# Patient Record
Sex: MALE | NOT HISPANIC OR LATINO | Employment: FULL TIME | URBAN - METROPOLITAN AREA
[De-identification: names, ages, dates, MRNs, and addresses within clinical notes are randomized per-mention and may not be internally consistent; named-entity substitution may affect disease eponyms.]

---

## 2021-05-24 ENCOUNTER — HOSPITAL ENCOUNTER (EMERGENCY)
Facility: HOSPITAL | Age: 26
Discharge: HOME | End: 2021-05-25
Admitting: EMERGENCY MEDICINE

## 2021-05-24 DIAGNOSIS — S61.512A WRIST LACERATION, LEFT, INITIAL ENCOUNTER: Primary | ICD-10-CM

## 2021-05-24 PROCEDURE — 99282 EMERGENCY DEPT VISIT SF MDM: CPT

## 2021-05-25 VITALS
WEIGHT: 185.19 LBS | HEIGHT: 67 IN | TEMPERATURE: 97.8 F | SYSTOLIC BLOOD PRESSURE: 118 MMHG | RESPIRATION RATE: 16 BRPM | BODY MASS INDEX: 29.07 KG/M2 | DIASTOLIC BLOOD PRESSURE: 75 MMHG | HEART RATE: 85 BPM | OXYGEN SATURATION: 99 %

## 2021-05-25 PROCEDURE — 90471 IMMUNIZATION ADMIN: CPT | Performed by: NURSE PRACTITIONER

## 2021-05-25 PROCEDURE — 25010000002 TDAP 5-2.5-18.5 LF-MCG/0.5 SUSPENSION: Performed by: NURSE PRACTITIONER

## 2021-05-25 PROCEDURE — 90715 TDAP VACCINE 7 YRS/> IM: CPT | Performed by: NURSE PRACTITIONER

## 2021-05-25 RX ORDER — CEPHALEXIN 500 MG/1
500 CAPSULE ORAL 4 TIMES DAILY
Qty: 28 CAPSULE | Refills: 0 | Status: SHIPPED | OUTPATIENT
Start: 2021-05-25

## 2021-05-25 RX ADMIN — TETANUS TOXOID, REDUCED DIPHTHERIA TOXOID AND ACELLULAR PERTUSSIS VACCINE, ADSORBED 0.5 ML: 5; 2.5; 8; 8; 2.5 SUSPENSION INTRAMUSCULAR at 00:40

## 2021-05-25 NOTE — ED PROVIDER NOTES
Subjective   Patient presents with:  Laceration    Provider, No Known    Patient is a 26-year-old male, no significant medical history, no medications, comes in the emergency department for a laceration to his left wrist, this was an accidental injury that happened at work with a .  The occurred just prior to arrival.  Patient denies any numbness or tingling.  No weakness.  He is unsure of his last tetanus immunization.          Review of Systems   Constitutional: Negative for chills and fever.   Skin: Positive for wound.       No past medical history on file.    No Known Allergies    No past surgical history on file.    No family history on file.    Social History     Socioeconomic History   • Marital status: Single     Spouse name: Not on file   • Number of children: Not on file   • Years of education: Not on file   • Highest education level: Not on file           Objective   Physical Exam  Vitals and nursing note reviewed.   Constitutional:       General: He is not in acute distress.     Appearance: Normal appearance. He is not ill-appearing, toxic-appearing or diaphoretic.      Comments: Resting comfortably in bed without acute distress   HENT:      Head: Normocephalic and atraumatic.      Nose: Nose normal.   Eyes:      Extraocular Movements: Extraocular movements intact.      Conjunctiva/sclera: Conjunctivae normal.      Pupils: Pupils are equal, round, and reactive to light.   Cardiovascular:      Rate and Rhythm: Normal rate and regular rhythm.      Heart sounds: Normal heart sounds. No murmur heard.   No friction rub. No gallop.    Pulmonary:      Effort: Pulmonary effort is normal.      Breath sounds: Normal breath sounds.   Musculoskeletal:      Left wrist: Laceration present. No swelling, deformity, effusion, tenderness, bony tenderness, snuff box tenderness or crepitus. Normal range of motion. Normal pulse.      Comments: Superficial laceration over the volar aspect of the wrist.  1 cm.   "Bleeding controlled.  No evidence for tendon injury.  No weakness noted.  Moves all digits through range of motion.  Radial pulse 2+.  Cap refill brisk.   Skin:     General: Skin is warm and dry.      Capillary Refill: Capillary refill takes less than 2 seconds.   Neurological:      General: No focal deficit present.      Mental Status: He is alert and oriented to person, place, and time.         Laceration Repair    Date/Time: 5/25/2021 12:28 AM  Performed by: Radha Angulo APRN  Authorized by: Radha Angulo APRN     Consent:     Consent obtained:  Verbal    Consent given by:  Patient    Risks discussed:  Infection, pain, poor cosmetic result, poor wound healing, vascular damage, nerve damage and need for additional repair    Alternatives discussed:  No treatment and observation  Anesthesia (see MAR for exact dosages):     Anesthesia method:  Local infiltration    Local anesthetic:  Lidocaine 2% w/o epi  Laceration details:     Location:  Hand    Hand location:  L wrist    Length (cm):  1  Pre-procedure details:     Preparation:  Patient was prepped and draped in usual sterile fashion  Exploration:     Hemostasis achieved with:  Direct pressure    Wound exploration: wound explored through full range of motion      Contaminated: no    Treatment:     Area cleansed with:  Saline and Betadine    Amount of cleaning:  Standard    Irrigation solution:  Sterile saline    Irrigation method:  Syringe  Skin repair:     Repair method:  Sutures    Suture size:  5-0    Suture material:  Nylon    Suture technique:  Simple interrupted    Number of sutures:  3  Approximation:     Approximation:  Close  Post-procedure details:     Dressing:  Open (no dressing)    Patient tolerance of procedure:  Tolerated well, no immediate complications               ED Course      /75 (BP Location: Right arm, Patient Position: Sitting)   Pulse 85   Temp 97.8 °F (36.6 °C) (Oral)   Resp 16   Ht 170.2 cm (67\")   Wt 84 kg (185 lb 3 " oz)   SpO2 99%   BMI 29.00 kg/m²   Labs Reviewed - No data to display  Medications   Tdap (BOOSTRIX) injection 0.5 mL (0.5 mL Intramuscular Given 5/25/21 0040)     No radiology results for the last day       Appropriate PPE was worn during the duration of the care for this patient while in the emergency department per [facility] guidelines                                MDM  Number of Diagnoses or Management Options  Wrist laceration, left, initial encounter  Diagnosis management comments: Patient is a 26-year-old male present emergency department for laceration to his left wrist with a  at work.  Given the superficial nature imaging not indicated.  It was repaired here in the ED without complication.  See procedure note.  Patient will be discharged home with antibiotics, wound care instructions and follow-up.    I spoke with the patient at the bedside regarding their plan of care, discharge instruction, home care, prescriptions, and importance follow-up.  We discussed test results at the bedside, including incidental abnormal labs, radiological findings, understands need for follow-up with primary care or specialist if indicated.      Patient was made aware of indications to return to the emergency department.  Patient agrees with the current plan of care for discharge, verbalized understanding of all instructions    Pt is aware that discharge does not mean that nothing is wrong but it indicates no emergency is present and they must continue care with follow-up as given below or physician of their choice                            Patient Progress  Patient progress: stable      Final diagnoses:   Wrist laceration, left, initial encounter       ED Disposition  ED Disposition     ED Disposition Condition Comment    Discharge Stable           [facility] OCCUPATIONAL MEDICINE  [address] [place] [address] [address]  [facility] [address]  [phone]  Schedule an appointment as soon as possible for a  visit       [facility] EMERGENCY DEPARTMENT  [address] [facility] [address] [phone]    As needed, If symptoms worsen         Medication List      New Prescriptions    cephalexin 500 MG capsule  Commonly known as: KEFLEX  Take 1 capsule by mouth 4 (Four) Times a Day.           Where to Get Your Medications      These medications were sent to [facility] #[id number] - [place], [place] - [address] [place] [address] RD. AT [facility] - [phone] [facility] [phone]   [address] [place] [address] RD., [facility] [address]    Phone: [phone]   · cephalexin 500 MG capsule          Radha Angulo, APRN  05/25/21 1310

## 2021-05-25 NOTE — DISCHARGE INSTRUCTIONS
Please follow-up with your primary care provider, call tomorrow for an appointment, if you do not have a primary care provider, please use the patient connection above to us to help establish care, please call as soon as possible.    Return the emergency department for new or worsening symptoms    Take medications as prescribed, any changes will be noted on your discharge instruction    Please follow-up with Occupational medicine, call for an appointment tomorrow    Sutures should be removed in 7 days